# Patient Record
Sex: MALE | Race: OTHER | NOT HISPANIC OR LATINO
[De-identification: names, ages, dates, MRNs, and addresses within clinical notes are randomized per-mention and may not be internally consistent; named-entity substitution may affect disease eponyms.]

---

## 2017-06-26 PROBLEM — Z00.00 ENCOUNTER FOR PREVENTIVE HEALTH EXAMINATION: Status: ACTIVE | Noted: 2017-06-26

## 2017-06-29 ENCOUNTER — NON-APPOINTMENT (OUTPATIENT)
Age: 52
End: 2017-06-29

## 2017-06-29 ENCOUNTER — APPOINTMENT (OUTPATIENT)
Dept: CARDIOLOGY | Facility: CLINIC | Age: 52
End: 2017-06-29

## 2017-06-29 VITALS
DIASTOLIC BLOOD PRESSURE: 76 MMHG | WEIGHT: 191 LBS | OXYGEN SATURATION: 97 % | HEIGHT: 65 IN | HEART RATE: 86 BPM | BODY MASS INDEX: 31.82 KG/M2 | SYSTOLIC BLOOD PRESSURE: 108 MMHG

## 2017-06-29 DIAGNOSIS — Z87.898 PERSONAL HISTORY OF OTHER SPECIFIED CONDITIONS: ICD-10-CM

## 2017-06-29 DIAGNOSIS — Z82.49 FAMILY HISTORY OF ISCHEMIC HEART DISEASE AND OTHER DISEASES OF THE CIRCULATORY SYSTEM: ICD-10-CM

## 2017-06-29 DIAGNOSIS — E78.00 PURE HYPERCHOLESTEROLEMIA, UNSPECIFIED: ICD-10-CM

## 2017-06-29 DIAGNOSIS — I10 ESSENTIAL (PRIMARY) HYPERTENSION: ICD-10-CM

## 2017-06-29 DIAGNOSIS — R06.02 SHORTNESS OF BREATH: ICD-10-CM

## 2017-06-29 DIAGNOSIS — Z01.818 ENCOUNTER FOR OTHER PREPROCEDURAL EXAMINATION: ICD-10-CM

## 2017-06-29 RX ORDER — LISINOPRIL 20 MG/1
20 TABLET ORAL DAILY
Qty: 30 | Refills: 0 | Status: ACTIVE | COMMUNITY
Start: 2017-02-21

## 2017-06-29 RX ORDER — GABAPENTIN 300 MG/1
300 CAPSULE ORAL 3 TIMES DAILY
Refills: 0 | Status: ACTIVE | COMMUNITY
Start: 2016-11-28

## 2017-06-29 RX ORDER — PRAVASTATIN SODIUM 40 MG/1
40 TABLET ORAL
Refills: 0 | Status: ACTIVE | COMMUNITY
Start: 2017-03-24

## 2017-06-29 RX ORDER — ASPIRIN AND DIPYRIDAMOLE 25; 200 MG/1; MG/1
25-200 CAPSULE, EXTENDED RELEASE ORAL TWICE DAILY
Refills: 0 | Status: ACTIVE | COMMUNITY

## 2017-06-29 RX ORDER — RANITIDINE 150 MG/1
150 TABLET ORAL
Refills: 0 | Status: ACTIVE | COMMUNITY
Start: 2016-11-28

## 2017-06-29 RX ORDER — AMLODIPINE BESYLATE 5 MG/1
5 TABLET ORAL DAILY
Qty: 30 | Refills: 0 | Status: ACTIVE | COMMUNITY
Start: 2016-11-09

## 2017-06-29 RX ORDER — HYDROCHLOROTHIAZIDE 25 MG/1
25 TABLET ORAL DAILY
Qty: 30 | Refills: 0 | Status: ACTIVE | COMMUNITY
Start: 2017-03-09

## 2017-07-03 ENCOUNTER — APPOINTMENT (OUTPATIENT)
Dept: CARDIOLOGY | Facility: CLINIC | Age: 52
End: 2017-07-03

## 2017-07-03 ENCOUNTER — OUTPATIENT (OUTPATIENT)
Dept: OUTPATIENT SERVICES | Facility: HOSPITAL | Age: 52
LOS: 1 days | End: 2017-07-03
Payer: MEDICAID

## 2017-07-03 DIAGNOSIS — Z00.8 ENCOUNTER FOR OTHER GENERAL EXAMINATION: ICD-10-CM

## 2017-07-03 PROCEDURE — 93017 CV STRESS TEST TRACING ONLY: CPT

## 2017-07-03 PROCEDURE — 93018 CV STRESS TEST I&R ONLY: CPT

## 2017-07-03 PROCEDURE — 93016 CV STRESS TEST SUPVJ ONLY: CPT

## 2018-11-26 ENCOUNTER — EMERGENCY (EMERGENCY)
Facility: HOSPITAL | Age: 53
LOS: 1 days | Discharge: HOME | End: 2018-11-26
Attending: EMERGENCY MEDICINE | Admitting: EMERGENCY MEDICINE
Payer: MEDICAID

## 2018-11-26 VITALS — SYSTOLIC BLOOD PRESSURE: 130 MMHG | DIASTOLIC BLOOD PRESSURE: 90 MMHG

## 2018-11-26 VITALS
OXYGEN SATURATION: 95 % | TEMPERATURE: 97 F | DIASTOLIC BLOOD PRESSURE: 66 MMHG | SYSTOLIC BLOOD PRESSURE: 93 MMHG | HEART RATE: 73 BPM | RESPIRATION RATE: 18 BRPM

## 2018-11-26 PROCEDURE — 99284 EMERGENCY DEPT VISIT MOD MDM: CPT

## 2018-11-26 NOTE — ED ADULT NURSE NOTE - NSIMPLEMENTINTERV_GEN_ALL_ED
Implemented All Fall with Harm Risk Interventions:  [place] to call system. Call bell, personal items and telephone within reach. Instruct patient to call for assistance. Room bathroom lighting operational. Non-slip footwear when patient is off stretcher. Physically safe environment: no spills, clutter or unnecessary equipment. Stretcher in lowest position, wheels locked, appropriate side rails in place. Provide visual cue, wrist band, yellow gown, etc. Monitor gait and stability. Monitor for mental status changes and reorient to person, place, and time. Review medications for side effects contributing to fall risk. Reinforce activity limits and safety measures with patient and family. Provide visual clues: red socks.

## 2018-11-26 NOTE — ED ADULT TRIAGE NOTE - CHIEF COMPLAINT QUOTE
patient found at [facility] on [address]. patient admits to drinking ETOH. BGL 74. no visible trauma, unable to give name during traige.

## 2018-11-26 NOTE — ED PROVIDER NOTE - MUSCULOSKELETAL, MLM
Spine appears normal, range of motion is not limited, no muscle or joint tenderness. atraumatic. no abrasions.

## 2018-11-26 NOTE — ED PROVIDER NOTE - MEDICAL DECISION MAKING DETAILS
Pt w etoh intox, brought in. nl glc by ems. alcohol on breath. neuro intact. arousable. atraumatic. metabolize and resasess. rpt bp wnl x2 without any intervention. suspect erroneous first bp reading

## 2018-11-26 NOTE — ED ADULT NURSE NOTE - NSINTERVENTIONOPT_GEN_ALL_ED
Move Toilet (commode/urinal) to patient using "arms reach"/Chair Alarms/Hourly Rounding/Enhanced Supervision

## 2018-11-26 NOTE — ED PROVIDER NOTE - OBJECTIVE STATEMENT
pt found at [facility]. admitted to etoh. staets he speaks english but doesn't state name. denies pain. not answering any other questions

## 2018-11-26 NOTE — ED PROVIDER NOTE - CONSTITUTIONAL, MLM
normal... Well appearing, well nourished, awake, alert, oriented to person, place, time/situation and in no apparent distress. arousable to touch. alcohol on breath

## 2018-11-26 NOTE — ED PROVIDER NOTE - PSYCHIATRIC, MLM
states he speaks english but not answering any other questions . normal mood and affect. no apparent risk to self or others.

## 2018-11-26 NOTE — ED ADULT NURSE NOTE - CHIEF COMPLAINT QUOTE
patient found at [facility] on [address]. patient admits to drinking ETOH. BGL 74. no visible trauma, unable to give name during traige.

## 2018-11-30 DIAGNOSIS — F10.120 ALCOHOL ABUSE WITH INTOXICATION, UNCOMPLICATED: ICD-10-CM

## 2018-11-30 DIAGNOSIS — R41.82 ALTERED MENTAL STATUS, UNSPECIFIED: ICD-10-CM

## 2025-03-14 ENCOUNTER — EMERGENCY (EMERGENCY)
Facility: HOSPITAL | Age: 60
LOS: 1 days | Discharge: HOME | End: 2025-03-14
Attending: EMERGENCY MEDICINE | Admitting: EMERGENCY MEDICINE
Payer: MEDICAID

## 2025-03-14 VITALS
TEMPERATURE: 98 F | SYSTOLIC BLOOD PRESSURE: 158 MMHG | DIASTOLIC BLOOD PRESSURE: 115 MMHG | OXYGEN SATURATION: 95 % | RESPIRATION RATE: 18 BRPM | WEIGHT: 167.99 LBS | HEIGHT: 65 IN | HEART RATE: 97 BPM

## 2025-03-14 LAB
ALBUMIN SERPL ELPH-MCNC: 3.4 G/DL — SIGNIFICANT CHANGE UP (ref 3.3–5)
ALP SERPL-CCNC: 48 U/L — SIGNIFICANT CHANGE UP (ref 40–120)
ALT FLD-CCNC: 33 U/L — SIGNIFICANT CHANGE UP (ref 12–78)
ANION GAP SERPL CALC-SCNC: 14 MMOL/L — SIGNIFICANT CHANGE UP (ref 5–17)
APTT BLD: 32.3 SEC — SIGNIFICANT CHANGE UP (ref 24.5–35.6)
AST SERPL-CCNC: 50 U/L — HIGH (ref 15–37)
BASOPHILS # BLD AUTO: 0.03 K/UL — SIGNIFICANT CHANGE UP (ref 0–0.2)
BASOPHILS NFR BLD AUTO: 0.6 % — SIGNIFICANT CHANGE UP (ref 0–2)
BILIRUB SERPL-MCNC: 0.6 MG/DL — SIGNIFICANT CHANGE UP (ref 0.2–1.2)
BUN SERPL-MCNC: 22 MG/DL — SIGNIFICANT CHANGE UP (ref 7–23)
CALCIUM SERPL-MCNC: 9.7 MG/DL — SIGNIFICANT CHANGE UP (ref 8.5–10.1)
CHLORIDE SERPL-SCNC: 94 MMOL/L — LOW (ref 96–108)
CO2 SERPL-SCNC: 28 MMOL/L — SIGNIFICANT CHANGE UP (ref 22–31)
CREAT SERPL-MCNC: 1.1 MG/DL — SIGNIFICANT CHANGE UP (ref 0.5–1.3)
EGFR: 78 ML/MIN/1.73M2 — SIGNIFICANT CHANGE UP
EGFR: 78 ML/MIN/1.73M2 — SIGNIFICANT CHANGE UP
EOSINOPHIL # BLD AUTO: 0.04 K/UL — SIGNIFICANT CHANGE UP (ref 0–0.5)
EOSINOPHIL NFR BLD AUTO: 0.8 % — SIGNIFICANT CHANGE UP (ref 0–6)
GLUCOSE SERPL-MCNC: 86 MG/DL — SIGNIFICANT CHANGE UP (ref 70–99)
HCT VFR BLD CALC: 39.3 % — SIGNIFICANT CHANGE UP (ref 39–50)
HGB BLD-MCNC: 13.6 G/DL — SIGNIFICANT CHANGE UP (ref 13–17)
IMM GRANULOCYTES NFR BLD AUTO: 0.4 % — SIGNIFICANT CHANGE UP (ref 0–0.9)
INR BLD: 1.01 RATIO — SIGNIFICANT CHANGE UP (ref 0.85–1.16)
LIDOCAIN IGE QN: 55 U/L — SIGNIFICANT CHANGE UP (ref 13–75)
LYMPHOCYTES # BLD AUTO: 1.74 K/UL — SIGNIFICANT CHANGE UP (ref 1–3.3)
LYMPHOCYTES # BLD AUTO: 36.3 % — SIGNIFICANT CHANGE UP (ref 13–44)
MAGNESIUM SERPL-MCNC: 1 MG/DL — CRITICAL LOW (ref 1.6–2.6)
MCHC RBC-ENTMCNC: 30.6 PG — SIGNIFICANT CHANGE UP (ref 27–34)
MCHC RBC-ENTMCNC: 34.6 G/DL — SIGNIFICANT CHANGE UP (ref 32–36)
MCV RBC AUTO: 88.3 FL — SIGNIFICANT CHANGE UP (ref 80–100)
MONOCYTES # BLD AUTO: 0.54 K/UL — SIGNIFICANT CHANGE UP (ref 0–0.9)
MONOCYTES NFR BLD AUTO: 11.3 % — SIGNIFICANT CHANGE UP (ref 2–14)
NEUTROPHILS # BLD AUTO: 2.42 K/UL — SIGNIFICANT CHANGE UP (ref 1.8–7.4)
NEUTROPHILS NFR BLD AUTO: 50.6 % — SIGNIFICANT CHANGE UP (ref 43–77)
NRBC BLD AUTO-RTO: 0 /100 WBCS — SIGNIFICANT CHANGE UP (ref 0–0)
NT-PROBNP SERPL-SCNC: 284 PG/ML — HIGH (ref 0–125)
PHOSPHATE SERPL-MCNC: 3.3 MG/DL — SIGNIFICANT CHANGE UP (ref 2.5–4.5)
PLATELET # BLD AUTO: 156 K/UL — SIGNIFICANT CHANGE UP (ref 150–400)
POTASSIUM SERPL-MCNC: 3.4 MMOL/L — LOW (ref 3.5–5.3)
POTASSIUM SERPL-SCNC: 3.4 MMOL/L — LOW (ref 3.5–5.3)
PROT SERPL-MCNC: 7.9 G/DL — SIGNIFICANT CHANGE UP (ref 6–8.3)
PROTHROM AB SERPL-ACNC: 11.9 SEC — SIGNIFICANT CHANGE UP (ref 9.9–13.4)
RBC # BLD: 4.45 M/UL — SIGNIFICANT CHANGE UP (ref 4.2–5.8)
RBC # FLD: 15 % — HIGH (ref 10.3–14.5)
SODIUM SERPL-SCNC: 136 MMOL/L — SIGNIFICANT CHANGE UP (ref 135–145)
WBC # BLD: 4.79 K/UL — SIGNIFICANT CHANGE UP (ref 3.8–10.5)
WBC # FLD AUTO: 4.79 K/UL — SIGNIFICANT CHANGE UP (ref 3.8–10.5)

## 2025-03-14 PROCEDURE — 93010 ELECTROCARDIOGRAM REPORT: CPT

## 2025-03-14 PROCEDURE — 99285 EMERGENCY DEPT VISIT HI MDM: CPT

## 2025-03-14 RX ORDER — KETOROLAC TROMETHAMINE 30 MG/ML
15 INJECTION, SOLUTION INTRAMUSCULAR; INTRAVENOUS ONCE
Refills: 0 | Status: DISCONTINUED | OUTPATIENT
Start: 2025-03-14 | End: 2025-03-14

## 2025-03-14 RX ORDER — MAGNESIUM SULFATE 500 MG/ML
2 SYRINGE (ML) INJECTION ONCE
Refills: 0 | Status: COMPLETED | OUTPATIENT
Start: 2025-03-14 | End: 2025-03-14

## 2025-03-14 RX ORDER — LORAZEPAM 4 MG/ML
2 VIAL (ML) INJECTION ONCE
Refills: 0 | Status: DISCONTINUED | OUTPATIENT
Start: 2025-03-14 | End: 2025-03-14

## 2025-03-14 RX ADMIN — Medication 25 GRAM(S): at 21:42

## 2025-03-14 RX ADMIN — Medication 40 MILLIEQUIVALENT(S): at 21:43

## 2025-03-14 RX ADMIN — Medication 1000 MILLILITER(S): at 19:13

## 2025-03-14 RX ADMIN — KETOROLAC TROMETHAMINE 15 MILLIGRAM(S): 30 INJECTION, SOLUTION INTRAMUSCULAR; INTRAVENOUS at 19:13

## 2025-03-14 NOTE — ED PROVIDER NOTE - CLINICAL SUMMARY MEDICAL DECISION MAKING FREE TEXT BOX
59-year-old male sent in for low magnesium, follow-up CBC, CMP, magnesium, phosphorus, IV fluids, EKG, patient complaining of right knee pain follow-up Toradol and reevaluate.

## 2025-03-14 NOTE — ED ADULT NURSE NOTE - NSFALLUNIVINTERV_ED_ALL_ED
Bed/Stretcher in lowest position, wheels locked, appropriate side rails in place/Call bell, personal items and telephone in reach/Instruct patient to call for assistance before getting out of bed/chair/stretcher/Non-slip footwear applied when patient is off stretcher/[place] to call system/Physically safe environment - no spills, clutter or unnecessary equipment/Purposeful proactive rounding/Room/bathroom lighting operational, light cord in reach

## 2025-03-14 NOTE — ED PROVIDER NOTE - NSFOLLOWUPINSTRUCTIONS_ED_ALL_ED_FT
Hypomagnesemia is a condition in which the level of magnesium in the blood is too low. Magnesium is a mineral that is found in many foods. It is used in many different processes in the body. Hypomagnesemia can affect every organ in the body. In severe cases, it can cause life-threatening problems.    What are the causes?  This condition may be caused by:  Not getting enough magnesium in your diet or not having enough healthy foods to eat (malnutrition).  Problems with magnesium absorption in the intestines.  Dehydration.  Excessive use of alcohol.  Vomiting.  Severe or long-term (chronic) diarrhea.  Some medicines, including medicines that make you urinate more often (diuretics).  Certain diseases, such as kidney disease, diabetes, celiac disease, and overactive thyroid.  What are the signs or symptoms?  Symptoms of this condition include:  Loss of appetite, nausea, and vomiting.  Involuntary shaking or trembling of a body part (tremor).  Muscle weakness or tingling in the arms and legs.  Sudden tightening of muscles (muscle spasms).  Confusion.  Psychiatric issues, such as:  Depression and irritability.  Psychosis.  A feeling of fluttering of the heart (palpitations).  Seizures.  These symptoms are more severe if magnesium levels drop suddenly.    How is this diagnosed?  This condition may be diagnosed based on:  Your symptoms and medical history.  A physical exam.  Blood and urine tests.  How is this treated?  A sign showing that a person should not drink alcohol.  Treatment depends on the cause and the severity of the condition. It may be treated by:  Taking a magnesium supplement. This can be taken in pill form. If the condition is severe, magnesium is usually given through an IV.  Making changes to your diet. You may be directed to eat foods that have a lot of magnesium, such as green leafy vegetables, peas, beans, and nuts.  Not drinking alcohol. If you are struggling not to drink, ask your health care provider for help.  Follow these instructions at home:  Eating and drinking    Esquivel beans.   A bunch of spinach.   Make sure that your diet includes foods with magnesium. Foods that have a lot of magnesium in them include:  Green leafy vegetables, such as spinach and broccoli.  Beans and peas.  Nuts and seeds, such as almonds and sunflower seeds.  Whole grains, such as whole grain bread and fortified cereals.  Drink fluids that contain salts and minerals (electrolytes), such as sports drinks, when you are active.  Do not drink alcohol.  General instructions    Take over-the-counter and prescription medicines only as told by your health care provider.  Take magnesium supplements as directed if your health care provider tells you to take them.  Have your magnesium levels monitored as told by your health care provider.  Keep all follow-up visits. This is important.  Contact a health care provider if:  You get worse instead of better.  Your symptoms return.  Get help right away if:  You develop severe muscle weakness.  You have trouble breathing.  You feel that your heart is racing.  These symptoms may represent a serious problem that is an emergency. Do not wait to see if the symptoms will go away. Get medical help right away. Call your local emergency services (911 in the [place] Do not drive yourself to the hospital.    Summary  Hypomagnesemia is a condition in which the level of magnesium in the blood is too low.  Hypomagnesemia can affect every organ in the body.  Treatment may include eating more foods that contain magnesium, taking magnesium supplements, and not drinking alcohol.  Have your magnesium levels monitored as told by your health care provider.  This information is not intended to replace advice given to you by your health care provider. Make sure you discuss any questions you have with your health care provider.    Document Revised: 05/17/2022 Document Reviewed: 05/17/2022

## 2025-03-14 NOTE — ED ADULT NURSE NOTE - OBJECTIVE STATEMENT
Pt presents to the ED sent in from [facility] for low magnesium. Pt is well appearing, pt has no complaints at this time. Labs drawn and sent. Pt @ [facility] for alc rehab.

## 2025-03-14 NOTE — ED PROVIDER NOTE - PROGRESS NOTE DETAILS
spoke with juany Rougemont [phone] Rachel, case discussed, ekg reviwed, after mag transfusion to dc back to [facility], patient calm, no tremors, states he feels well

## 2025-03-14 NOTE — ED PROVIDER NOTE - PATIENT PORTAL LINK FT
You can access the FollowMyHealth Patient Portal offered by [facility] by registering at the following website: [email or web address]. By joining [employer]’s FollowMyHealth portal, you will also be able to view your health information using other applications (apps) compatible with our system.

## 2025-03-14 NOTE — ED PROVIDER NOTE - OBJECTIVE STATEMENT
59-year-old male PMH of HTN, EtOH abuse, currently in [facility] for detox for EtOH abuse, presenting to the ER by ambulance with report of low magnesium level 0.9, patient complaining of right knee pain, denies vomiting, states he feels tired.

## 2025-03-15 VITALS — HEART RATE: 86 BPM | SYSTOLIC BLOOD PRESSURE: 127 MMHG | DIASTOLIC BLOOD PRESSURE: 83 MMHG

## 2025-03-15 PROCEDURE — 85610 PROTHROMBIN TIME: CPT

## 2025-03-15 PROCEDURE — 99284 EMERGENCY DEPT VISIT MOD MDM: CPT | Mod: 25

## 2025-03-15 PROCEDURE — 36415 COLL VENOUS BLD VENIPUNCTURE: CPT

## 2025-03-15 PROCEDURE — 85730 THROMBOPLASTIN TIME PARTIAL: CPT

## 2025-03-15 PROCEDURE — 96374 THER/PROPH/DIAG INJ IV PUSH: CPT

## 2025-03-15 PROCEDURE — 83690 ASSAY OF LIPASE: CPT

## 2025-03-15 PROCEDURE — 96375 TX/PRO/DX INJ NEW DRUG ADDON: CPT

## 2025-03-15 PROCEDURE — 84100 ASSAY OF PHOSPHORUS: CPT

## 2025-03-15 PROCEDURE — 83735 ASSAY OF MAGNESIUM: CPT

## 2025-03-15 PROCEDURE — 80053 COMPREHEN METABOLIC PANEL: CPT

## 2025-03-15 PROCEDURE — 85025 COMPLETE CBC W/AUTO DIFF WBC: CPT

## 2025-03-15 PROCEDURE — 93005 ELECTROCARDIOGRAM TRACING: CPT

## 2025-03-15 PROCEDURE — 83880 ASSAY OF NATRIURETIC PEPTIDE: CPT

## 2025-03-15 RX ADMIN — Medication 1000 MILLILITER(S): at 00:34

## 2025-03-15 RX ADMIN — Medication 2 MILLIGRAM(S): at 00:34

## 2025-03-15 NOTE — ED ADULT NURSE REASSESSMENT NOTE - NSFALLOOBATTEMPT_ED_ALL_ED
No
Gen: Alert, NAD, well appearing  Head: NC, AT, PERRL, EOMI, normal lids/conjunctiva  ENT: normal hearing  Neck: +supple, no tenderness/meningismus,  Pulm: Bilateral BS, normal resp effort, no wheeze/stridor/retractions  CV: RRR  Abd: soft, NT/ND  Mskel: no edema/erythema/cyanosis  Skin: no rash, warm/dry  Neuro: AAO2, no sensory/motor deficits

## 2025-03-15 NOTE — ED ADULT NURSE REASSESSMENT NOTE - CARDIO ASSESSMENT
Westley Ness      1943    Wound Dressing Change: left arm  Cleanse wound and surrounding skin with: mild soap and water in shower  Cover wound with Xeroform, then ABD pad, then Spandage or roll gauze & tape  Change dressing 3 times weekly    To prevent future trauma:  - Avoid adhesives  - use adhesive remover- Unisol; No Sting Film barrier/ Spray or barrier swabs  - Use hypoallergenic, high quality moisturizer on intact skin daily (CeraVe, Eucerin, Vanicream)      Keiko Castillo PA-C. January 18, 2022    Call us at [phone] if you have any questions about your wounds, have redness or swelling around your wound, have a fever of 101 or greater or if you have any other problems or concerns. We answer the phone Monday through Friday 8 am to 4 pm, please leave a message as we check the voicemail frequently throughout the day.     If you had a positive experience please indicate on your patient satisfaction survey form that [employer] [facility] will be sending you.    If you have any billing related questions please call the  [facility] office at [phone]. The clinic staff does not handle billing related matters.     ---

## 2025-03-18 ENCOUNTER — EMERGENCY (EMERGENCY)
Facility: HOSPITAL | Age: 60
LOS: 1 days | Discharge: HOME | End: 2025-03-18
Attending: STUDENT IN AN ORGANIZED HEALTH CARE EDUCATION/TRAINING PROGRAM | Admitting: STUDENT IN AN ORGANIZED HEALTH CARE EDUCATION/TRAINING PROGRAM
Payer: MEDICAID

## 2025-03-18 VITALS
WEIGHT: 154.98 LBS | HEART RATE: 76 BPM | OXYGEN SATURATION: 99 % | RESPIRATION RATE: 18 BRPM | HEIGHT: 65 IN | DIASTOLIC BLOOD PRESSURE: 96 MMHG | SYSTOLIC BLOOD PRESSURE: 145 MMHG

## 2025-03-18 VITALS
SYSTOLIC BLOOD PRESSURE: 134 MMHG | HEART RATE: 82 BPM | OXYGEN SATURATION: 99 % | RESPIRATION RATE: 20 BRPM | DIASTOLIC BLOOD PRESSURE: 95 MMHG

## 2025-03-18 LAB
ALBUMIN SERPL ELPH-MCNC: 3.8 G/DL — SIGNIFICANT CHANGE UP (ref 3.3–5)
ALP SERPL-CCNC: 47 U/L — SIGNIFICANT CHANGE UP (ref 40–120)
ALT FLD-CCNC: 17 U/L — SIGNIFICANT CHANGE UP (ref 4–41)
AMMONIA BLD-MCNC: 10 UMOL/L — LOW (ref 11–55)
ANION GAP SERPL CALC-SCNC: 19 MMOL/L — HIGH (ref 7–14)
APPEARANCE UR: CLEAR — SIGNIFICANT CHANGE UP
APTT BLD: 30.9 SEC — SIGNIFICANT CHANGE UP (ref 24.5–35.6)
AST SERPL-CCNC: 30 U/L — SIGNIFICANT CHANGE UP (ref 4–40)
BACTERIA # UR AUTO: NEGATIVE /HPF — SIGNIFICANT CHANGE UP
BASOPHILS # BLD AUTO: 0.03 K/UL — SIGNIFICANT CHANGE UP (ref 0–0.2)
BASOPHILS NFR BLD AUTO: 0.4 % — SIGNIFICANT CHANGE UP (ref 0–2)
BILIRUB SERPL-MCNC: 0.4 MG/DL — SIGNIFICANT CHANGE UP (ref 0.2–1.2)
BILIRUB UR-MCNC: NEGATIVE — SIGNIFICANT CHANGE UP
BLOOD GAS VENOUS COMPREHENSIVE RESULT: SIGNIFICANT CHANGE UP
BUN SERPL-MCNC: 18 MG/DL — SIGNIFICANT CHANGE UP (ref 7–23)
CALCIUM SERPL-MCNC: 9.9 MG/DL — SIGNIFICANT CHANGE UP (ref 8.4–10.5)
CAST: 0 /LPF — SIGNIFICANT CHANGE UP (ref 0–4)
CHLORIDE SERPL-SCNC: 96 MMOL/L — LOW (ref 98–107)
CO2 SERPL-SCNC: 21 MMOL/L — LOW (ref 22–31)
COLOR SPEC: YELLOW — SIGNIFICANT CHANGE UP
CREAT SERPL-MCNC: 1.16 MG/DL — SIGNIFICANT CHANGE UP (ref 0.5–1.3)
DIFF PNL FLD: ABNORMAL
EGFR: 73 ML/MIN/1.73M2 — SIGNIFICANT CHANGE UP
EGFR: 73 ML/MIN/1.73M2 — SIGNIFICANT CHANGE UP
EOSINOPHIL # BLD AUTO: 0.05 K/UL — SIGNIFICANT CHANGE UP (ref 0–0.5)
EOSINOPHIL NFR BLD AUTO: 0.6 % — SIGNIFICANT CHANGE UP (ref 0–6)
ETHANOL SERPL-MCNC: 222 MG/DL — HIGH
FLUAV AG NPH QL: SIGNIFICANT CHANGE UP
FLUBV AG NPH QL: SIGNIFICANT CHANGE UP
GLUCOSE SERPL-MCNC: 94 MG/DL — SIGNIFICANT CHANGE UP (ref 70–99)
GLUCOSE UR QL: NEGATIVE MG/DL — SIGNIFICANT CHANGE UP
HCT VFR BLD CALC: 38.2 % — LOW (ref 39–50)
HGB BLD-MCNC: 12.9 G/DL — LOW (ref 13–17)
IANC: 5.87 K/UL — SIGNIFICANT CHANGE UP (ref 1.8–7.4)
IMM GRANULOCYTES NFR BLD AUTO: 0.5 % — SIGNIFICANT CHANGE UP (ref 0–0.9)
INR BLD: 1.07 RATIO — SIGNIFICANT CHANGE UP (ref 0.85–1.16)
KETONES UR-MCNC: NEGATIVE MG/DL — SIGNIFICANT CHANGE UP
LEUKOCYTE ESTERASE UR-ACNC: NEGATIVE — SIGNIFICANT CHANGE UP
LYMPHOCYTES # BLD AUTO: 1.09 K/UL — SIGNIFICANT CHANGE UP (ref 1–3.3)
LYMPHOCYTES # BLD AUTO: 14 % — SIGNIFICANT CHANGE UP (ref 13–44)
MCHC RBC-ENTMCNC: 30.7 PG — SIGNIFICANT CHANGE UP (ref 27–34)
MCHC RBC-ENTMCNC: 33.8 G/DL — SIGNIFICANT CHANGE UP (ref 32–36)
MCV RBC AUTO: 91 FL — SIGNIFICANT CHANGE UP (ref 80–100)
MONOCYTES # BLD AUTO: 0.73 K/UL — SIGNIFICANT CHANGE UP (ref 0–0.9)
MONOCYTES NFR BLD AUTO: 9.3 % — SIGNIFICANT CHANGE UP (ref 2–14)
NEUTROPHILS # BLD AUTO: 5.87 K/UL — SIGNIFICANT CHANGE UP (ref 1.8–7.4)
NEUTROPHILS NFR BLD AUTO: 75.2 % — SIGNIFICANT CHANGE UP (ref 43–77)
NITRITE UR-MCNC: NEGATIVE — SIGNIFICANT CHANGE UP
NRBC # BLD AUTO: 0 K/UL — SIGNIFICANT CHANGE UP (ref 0–0)
NRBC # FLD: 0 K/UL — SIGNIFICANT CHANGE UP (ref 0–0)
NRBC BLD AUTO-RTO: 0 /100 WBCS — SIGNIFICANT CHANGE UP (ref 0–0)
PH UR: 6.5 — SIGNIFICANT CHANGE UP (ref 5–8)
PLATELET # BLD AUTO: 160 K/UL — SIGNIFICANT CHANGE UP (ref 150–400)
POTASSIUM SERPL-MCNC: 3.5 MMOL/L — SIGNIFICANT CHANGE UP (ref 3.5–5.3)
POTASSIUM SERPL-SCNC: 3.5 MMOL/L — SIGNIFICANT CHANGE UP (ref 3.5–5.3)
PROT SERPL-MCNC: 7.4 G/DL — SIGNIFICANT CHANGE UP (ref 6–8.3)
PROT UR-MCNC: 100 MG/DL
PROTHROM AB SERPL-ACNC: 12.4 SEC — SIGNIFICANT CHANGE UP (ref 9.9–13.4)
RBC # BLD: 4.2 M/UL — SIGNIFICANT CHANGE UP (ref 4.2–5.8)
RBC # FLD: 14.7 % — HIGH (ref 10.3–14.5)
RBC CASTS # UR COMP ASSIST: 16 /HPF — HIGH (ref 0–4)
RSV RNA NPH QL NAA+NON-PROBE: SIGNIFICANT CHANGE UP
SARS-COV-2 RNA SPEC QL NAA+PROBE: SIGNIFICANT CHANGE UP
SODIUM SERPL-SCNC: 136 MMOL/L — SIGNIFICANT CHANGE UP (ref 135–145)
SP GR SPEC: 1.01 — SIGNIFICANT CHANGE UP (ref 1–1.03)
SQUAMOUS # UR AUTO: 0 /HPF — SIGNIFICANT CHANGE UP (ref 0–5)
TSH SERPL-MCNC: 1.97 UIU/ML — SIGNIFICANT CHANGE UP (ref 0.27–4.2)
UROBILINOGEN FLD QL: 0.2 MG/DL — SIGNIFICANT CHANGE UP (ref 0.2–1)
WBC # BLD: 7.81 K/UL — SIGNIFICANT CHANGE UP (ref 3.8–10.5)
WBC # FLD AUTO: 7.81 K/UL — SIGNIFICANT CHANGE UP (ref 3.8–10.5)
WBC UR QL: 1 /HPF — SIGNIFICANT CHANGE UP (ref 0–5)

## 2025-03-18 PROCEDURE — 71045 X-RAY EXAM CHEST 1 VIEW: CPT | Mod: 26

## 2025-03-18 PROCEDURE — 99284 EMERGENCY DEPT VISIT MOD MDM: CPT | Mod: 25

## 2025-03-18 RX ADMIN — Medication 1000 MILLILITER(S): at 04:37

## 2025-03-18 RX ADMIN — Medication 1000 MILLILITER(S): at 06:32

## 2025-03-18 NOTE — ED PROVIDER NOTE - PHYSICAL EXAMINATION
GENERAL: Not in acute distress, non-toxic appearing  HEAD: normocephalic, atraumatic  HEENT: PERRL, EOMI, normal conjunctiva, oral mucosa moist, neck supple  CARDIAC: appears well perfused, RRR   PULM: normal work of breathing, clear to auscultation bilat   GI: abdomen nondistended, nontender  NEURO: alert and oriented x 1, + slurred speech, moving all 4 extremities  MSK: NO midline tenderness to cervical, thoracic, or thoracic spine, no hip tenderness or pelvis stable, No visible deformities, no peripheral edema,   SKIN: + abrasion to the right knee,  dry,

## 2025-03-18 NOTE — PROVIDER CONTACT NOTE (OTHER) - ASSESSMENT
met with patient at bedside and confirmed pt is residing at a shelter located in [address] [place] [address].

## 2025-03-18 NOTE — ED PROVIDER NOTE - ATTENDING CONTRIBUTION TO CARE
I personally made the management plan, and take responsibility for the patient's management. I have discussed with and reviewed the Resident's note and agree with the History, ROS, Physical Exam and MDM unless otherwise indicated. A brief summary of my personal evaluation and impression can be found below.    59-year-old male with a past medical history of hypertension, alcohol abuse, previously in [facility] for detox seen in the emergency department on 14 March for hypomagnesemia.  Received mag repletion was discharged now presents intoxicated.  On arrival patient awake, not answering questions well, vital signs blood pressure 145/96, respiratory rate 18, heart rate 76, SpO2 99%.  Fingerstick on arrival 94.  Patient denies any complaints.  On assessment in room 5 RN performed a rectal temperature found to be ~92.     General: disheveled appearing, NAD  Head: Atraumatic, normocephalic  Eyes: EOM grossly in tact, no scleral icterus  ENT: moist mucous membranes  Neurology: A&Ox 2, slowed cognition  Respiratory: normal respiratory effort  CV: Extremities warm and well perfused  Abdominal: Soft, non-distended, non tender  pelvis: stable, non tender  Extremities: No edema  Skin: No rashes, abrasion to R knee  Spine: no midline spinal tenderness, step off or crepitus     Patient disheveled appearing, found to be hypothermic, will send sepsis labs though suspect it may be primarily environmental given patient reports using alcohol with family outside.  Will also check a TSH, viral swab, ammonia level, alcohol level, placed on hypothermia blanket.  Will defer antibiotics at this time given her suspicion of environmental hyperthermia however if source found would give antibiotics.

## 2025-03-18 NOTE — ED PROVIDER NOTE - PROGRESS NOTE DETAILS
Kevin Carrero PGY3:  Pt's rectal temp found to be hypothermic. Pt was found down by EMS. Will send off infectious labs as well as place warmer blanket. Hypothermia could be in setting of intox and environmental. Will hold on empiric antibiotics at this time until more data from labs and further eval. Agapito Morales MD, PGY3  Patient reassessed.  Clinically sober and normothermic. ambulating, tolerating PO.  Repeat blood gas showing cleared lactate, no longer acidotic.  Lab work nonactionable.  Patient states he is residing in shelter currently.  Social work contacted for disposition.

## 2025-03-18 NOTE — PROVIDER CONTACT NOTE (OTHER) - SITUATION
Per provider, patient is cleared for discharge. Patient resides at a shelter and needs assistance with transportation.

## 2025-03-18 NOTE — ED ADULT TRIAGE NOTE - CHIEF COMPLAINT QUOTE
pt arrives intoxicated, endorses drinking a quart and a half of vodka. +AOB. as per EMS, pt found laying on ground. denies head strike, blood thinner use. unable to obtain temperature in triage

## 2025-03-18 NOTE — PROVIDER CONTACT NOTE (OTHER) - ACTION/TREATMENT ORDERED:
Transportation coordinated via [facility] with [employer] [phone] for 11:45am . Inv# [id number].

## 2025-03-18 NOTE — ED ADULT NURSE REASSESSMENT NOTE - NS ED NURSE REASSESS COMMENT FT1
pt belongings secured, valuables brought to security. FS 94.
report received from previous shift.  pt sleeping, easily arousable, denies pain, v/s as noted.  awaiting dispo, will monitor
Pt taken off of FRANSISCO gger. Pt safety maintained.
Hygiene care performed. Pt noted to be in no acute distress. RR even and unlabored. VS as charted. FRANSISCO hugger in place. Sacral skin noted to be intact. FLuids infusing as ordered. Pt safety maintained. Plan to metabolize to freedom.
Pt noted to be in no acute distress. Rectal temp as per flowsheet. Pt able to follow commands and speak in full sentences. VS as charted. Pt safety maintained.

## 2025-03-18 NOTE — ED PROVIDER NOTE - PATIENT PORTAL LINK FT
You can access the FollowMyHealth Patient Portal offered by [facility] by registering at the following website: [email or web address]. By joining [employer]’s FollowMyHealth portal, you will also be able to view your health information using other applications (apps) compatible with our system.

## 2025-03-18 NOTE — ED PROVIDER NOTE - CLINICAL SUMMARY MEDICAL DECISION MAKING FREE TEXT BOX
59-year-old male with history of hypertension, alcohol abuse, comes into the ED via EMS after being found on the ground.  Per triage patient endorses using drinking bottle of vodka prior to coming in.  Currently in the emergency department he appears intoxicated however he is able to state his name and where he is.  Does appear intoxicated.  He denies any abdominal pain, chest pain, states he did not have any falls or head strike.  On exam he does not have any signs of trauma other than abrasion to the right knee.  He is alert to verbal stimuli however he is very slow to respond and move. Upon reevaluation on rectal temp patient was found to be hypothermic. Will assess for infectious etiology as hypothyroidism, ammonia, also considering environmental exposure given he is intox and found down outside. Dispo pending work up and reeval. Pt has not sigsn of trauma to head or neck and is awake and answering questions. No indication for head ct at this time.

## 2025-03-18 NOTE — ED ADULT NURSE NOTE - OBJECTIVE STATEMENT
59 year old AO3 ambulatory with a cane at bedside coming in with c/o alcohol intoxication, pt found by EMS laying on the ground. Pt denies headache, dizziness, nausea, vomiting, head strike, anticoagulant use, vision changes, numbness, tingling. Able to speak in full sentences. No facial asymmetry or slurred speech noted. No drifts in extremities noted. RR even and unlabored. Unable to obtain oral temp so rectal temp obtained. MD made aware. Osbaldo 20g IV placed to the AC, full septic workup done. Pt placed on the hypothermia blanket. Noted to be sinus rhythm on tele. Pt able to follow commands and answer all questions. Pt safety maintained. Pending results and reassessment.

## 2025-03-23 LAB
CULTURE RESULTS: SIGNIFICANT CHANGE UP
CULTURE RESULTS: SIGNIFICANT CHANGE UP
SPECIMEN SOURCE: SIGNIFICANT CHANGE UP
SPECIMEN SOURCE: SIGNIFICANT CHANGE UP